# Patient Record
Sex: FEMALE | Race: WHITE | NOT HISPANIC OR LATINO | Employment: PART TIME | URBAN - METROPOLITAN AREA
[De-identification: names, ages, dates, MRNs, and addresses within clinical notes are randomized per-mention and may not be internally consistent; named-entity substitution may affect disease eponyms.]

---

## 2021-10-10 ENCOUNTER — HOSPITAL ENCOUNTER (EMERGENCY)
Facility: CLINIC | Age: 21
Discharge: HOME | End: 2021-10-10
Attending: EMERGENCY MEDICINE | Admitting: EMERGENCY MEDICINE
Payer: COMMERCIAL

## 2021-10-10 VITALS
OXYGEN SATURATION: 98 % | WEIGHT: 130 LBS | HEART RATE: 126 BPM | HEIGHT: 66 IN | TEMPERATURE: 98 F | BODY MASS INDEX: 20.89 KG/M2 | DIASTOLIC BLOOD PRESSURE: 79 MMHG | RESPIRATION RATE: 16 BRPM | SYSTOLIC BLOOD PRESSURE: 134 MMHG

## 2021-10-10 DIAGNOSIS — S01.01XA LACERATION OF SCALP, INITIAL ENCOUNTER: ICD-10-CM

## 2021-10-10 PROCEDURE — 12002 RPR S/N/AX/GEN/TRNK2.6-7.5CM: CPT

## 2021-10-10 PROCEDURE — 99283 EMERGENCY DEPT VISIT LOW MDM: CPT

## 2021-10-10 ASSESSMENT — ENCOUNTER SYMPTOMS: WOUND: 1

## 2021-10-10 ASSESSMENT — MIFFLIN-ST. JEOR: SCORE: 1371.43

## 2021-10-10 NOTE — ED TRIAGE NOTES
Patient here with laceration to the top of her head.she stated she was jumping up and hit the wall. She denies LOC

## 2021-10-10 NOTE — ED PROVIDER NOTES
"  History   Chief Complaint:  Head Laceration       HPI   Stella Lockett is a 20 year old female who presents with a head injury. The patient reports that earlier this evening around 9 hours ago she jumped up and hit her head on the ceiling. She sustained a laceration to the top of her head, and notes swelling to the area on arrival. No other injuries or trauma. No loss of consciousness or blood thinners.    Review of Systems   Skin: Positive for wound.   10 systems reviewed and negative except as above and in HPI.       Medications:  Trazodone  Svitlana    Past Medical History:    Insomnia    Past Surgical History:    Tonsillectomy    Social History:  Presents to ED with boyfriend.    Physical Exam     Patient Vitals for the past 24 hrs:   BP Temp Temp src Pulse Resp SpO2 Height Weight   10/10/21 0342 -- 98  F (36.7  C) Temporal -- -- -- -- --   10/10/21 0338 134/79 -- -- (!) 126 20 98 % 1.676 m (5' 6\") 59 kg (130 lb)       Physical Exam  General: Alert, No distress. Nontoxic appearance  Head: 4 cm laceration to the scalp.  Mouth/Throat: Oropharynx moist.   Eyes: Conjunctivae are normal. Pupils are equal..   Neck: Normal range of motion.    CV: Appears well perfused.  Resp:No respiratory distress.   MSK: Normal range of motion. No obvious deformity.   Neuro: The patient is alert and interactive. OCHOA. Speech normal. GCS 15  Skin: No lesion or sign of trauma noted.   Psych: normal mood and affect. behavior is normal.      Emergency Department Course     Procedures    Laceration Repair        LACERATION:  A simple clean 4 cm laceration.      LOCATION:  Top of scalp      ANESTHESIA:  1% lidocaine      PREPARATION:  Irrigation and Scrubbing with Normal Saline      DEBRIDEMENT:  no debridement      CLOSURE:  Wound was closed with 5 Staples     Emergency Department Course:  Reviewed:  0342 I reviewed the patient's nursing notes, vitals, past medical records, Care Everywhere.     Assessments/Consults:  0345 I " performed an exam as documented above.    Disposition:  The patient was discharged to home.     Impression & Plan     Medical Decision Making:  Stella Lockett is a 20 year old female who presents for evaluation of a laceration to the scalp.  The wound was carefully evaluated and explored.  The laceration was closed with 5 staples as noted above.  There is no evidence of galeal or bony damage with this laceration.  No signs of foreign body.  Possible complications (infection, scarring) were reviewed with the patient.  Follow up with primary care as noted in the discharge section.    Diagnosis:    ICD-10-CM    1. Laceration of scalp, initial encounter  S01.01XA        Scribe Disclosure:  Jonathan GONZALES, am serving as a scribe at 3:45 AM on 10/10/2021 to document services personally performed by Mary Thapa MD based on my observations and the provider's statements to me.      Mary Thapa MD  10/10/21 0519